# Patient Record
Sex: MALE | NOT HISPANIC OR LATINO | Employment: UNEMPLOYED | URBAN - METROPOLITAN AREA
[De-identification: names, ages, dates, MRNs, and addresses within clinical notes are randomized per-mention and may not be internally consistent; named-entity substitution may affect disease eponyms.]

---
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
## 2018-07-25 ENCOUNTER — OFFICE VISIT (OUTPATIENT)
Dept: FAMILY MEDICINE | Facility: CLINIC | Age: 18
End: 2018-07-25
Payer: COMMERCIAL

## 2018-07-25 VITALS
HEIGHT: 68 IN | WEIGHT: 123.4 LBS | SYSTOLIC BLOOD PRESSURE: 97 MMHG | RESPIRATION RATE: 18 BRPM | DIASTOLIC BLOOD PRESSURE: 60 MMHG | HEART RATE: 58 BPM | OXYGEN SATURATION: 97 % | TEMPERATURE: 97.4 F | BODY MASS INDEX: 18.7 KG/M2

## 2018-07-25 DIAGNOSIS — Z00.00 HEALTHCARE MAINTENANCE: ICD-10-CM

## 2018-07-25 DIAGNOSIS — Z00.121 ENCOUNTER FOR ROUTINE CHILD HEALTH EXAMINATION WITH ABNORMAL FINDINGS: Primary | ICD-10-CM

## 2018-07-25 LAB
CHOLEST SERPL-MCNC: 119 MG/DL
FASTING?: NO
HDLC SERPL-MCNC: 51 MG/DL
HIV 1+2 AB+HIV1 P24 AG SERPL QL IA: NEGATIVE
LDLC SERPL CALC-MCNC: 59 MG/DL
TRIGL SERPL-MCNC: 43 MG/DL

## 2018-07-25 NOTE — NURSING NOTE
Well child hearing and vision screening        HEARING FREQUENCY:  Right Ear:    500 Hz: 25 db HL present  1000 Hz: 20 db HL  present  2000 Hz: 20 db HL  present  4000 Hz: 20 db HL  present  6000 Hz: 20 dB HL (11 years and older)  present    Left Ear:    500 Hz: 25 db HL  present  1000 Hz: 20 db HL  present  2000 Hz: 20 db HL  present  4000 Hz: 20 db HL  present  6000 Hz: 20 dB HL (11 years and older)  present    Hearing Screen:  Pass-- [place] all tones    VISION:  Far vision: Right eye 20/40, Left eye 20/25  Plus lens (5 years and older who pass distance screening and do not have corrective lens):  Pass - blurred vision  Vision correction:  None    Eveline Allred, CMA

## 2018-07-25 NOTE — MR AVS SNAPSHOT
"              After Visit Summary   7/25/2018    Ji Yeboah    MRN: [id number]           Patient Information     Date Of Birth          2000        Visit Information        Provider Department      7/25/2018 1:20 PM César Bo MD [facility]        Today's Diagnoses     Encounter for routine child health examination with abnormal findings    -  1    Healthcare maintenance           Follow-ups after your visit        Who to contact     Please call your clinic at [phone] to:    Ask questions about your health    Make or cancel appointments    Discuss your medicines    Learn about your test results    Speak to your doctor            Additional Information About Your Visit        Care EveryWhere ID     This is your Care EveryWhere ID. This could be used by other organizations to access your [facility] medical records  [id number]        Your Vitals Were     Pulse Temperature Respirations Height Pulse Oximetry BMI (Body Mass Index)    58 97.4  F (36.3  C) (Oral) 18 5' 7.75\" (172.1 cm) 97% 18.9 kg/m2       Blood Pressure from Last 3 Encounters:   07/25/18 97/60   04/20/17 (!) 85/54   02/23/17 93/54    Weight from Last 3 Encounters:   07/25/18 123 lb 6.4 oz (56 kg)   04/20/17 122 lb (55.3 kg) (6 %)*   02/23/17 122 lb 12.8 oz (55.7 kg) (7 %)*     * Growth percentiles are based on CDC 2-20 Years data.              We Performed the Following     HIV Ag/Ab Screen [place] [facility]     Lipid [place] ([facility]) - Results > 1 hr     SCREENING TEST, PURE TONE, AIR ONLY     SCREENING, VISUAL ACUITY, QUANTITATIVE, BILAT     Social-emotional screen (PHQ-9) [address]7399     Syphilis Screen [place] (RPR/VDRL) ([facility])        Primary Care Provider Office Phone # Fax #    [place] Yousuf Palla, [phone]       [address] [address]        Equal Access to Services     CELESTINA BELLO AH: Татьяна shetty hadasho Soomaali, waaxda luqadaha, qaybta kaalmada " astrid lamblizatatum siria ah. Sophia [facility] [phone].    ATENCIÓN: Si habla español, tiene a gates disposición servicios gratuitos de asistencia lingüística. Marium al [phone].    We comply with applicable federal civil rights laws and [place] laws. We do not discriminate on the basis of race, color, national origin, age, disability, sex, sexual orientation, or gender identity.            Thank you!     Thank you for choosing [facility]  for your care. Our goal is always to provide you with excellent care. Hearing back from our patients is one way we can continue to improve our services. Please take a few minutes to complete the written survey that you may receive in the mail after your visit with us. Thank you!             Your Updated Medication List - Protect others around you: Learn how to safely use, store and throw away your medicines at www.disposemymeds.org.      Notice  As of 7/25/2018 11:59 PM    You have not been prescribed any medications.

## 2018-07-25 NOTE — PROGRESS NOTES
"Child & Teen Check Up Year 18-20       Health History       Growth Percentile:    Wt Readings from Last 3 Encounters:   07/25/18 123 lb 6.4 oz (56 kg)   04/20/17 122 lb (55.3 kg) (6 %)*   02/23/17 122 lb 12.8 oz (55.7 kg) (7 %)*     * Growth percentiles are based on [facility] 2-20 Years data.      Ht Readings from Last 2 Encounters:   07/25/18 5' 7.75\" (172.1 cm)   04/20/17 5' 7.52\" (171.5 cm) (24 %)*     * Growth percentiles are based on [facility] 2-20 Years data.    Normalized BMI data available only for age 0 to 20 years.    Visit Vitals: BP 97/60  Pulse 58  Temp 97.4  F (36.3  C) (Oral)  Resp 18  Ht 5' 7.75\" (172.1 cm)  Wt 123 lb 6.4 oz (56 kg)  SpO2 97%  BMI 18.9 kg/m2  BP Percentile: Normalized stature-for-age data not available for patients older than 20 years.    Informant: Patient    Patient, Family speaks English and so an  was not used.  Family History:   Family History   Problem Relation Age of Onset     Diabetes No family hx of      Coronary Artery Disease No family hx of      Hypertension No family hx of      Cerebrovascular Disease No family hx of      Breast Cancer No family hx of      Colon Cancer No family hx of      Prostate Cancer No family hx of      Other Cancer No family hx of      Asthma No family hx of      Dyslipidemia Screening:  Pediatric hyperlipidemia risk factors discussed today: No increased risk  Lipid screening performed (recommended if any risk factors): Yes    Social History:   Did the family/guardian worry about wether their food would run out before they got money to buy more? No  Did the family/guardian find that the food they bought didn't last long enough and they didn't have money to get more?  No    Social History     Social History     Marital status: Single     Spouse name: N/A     Number of children: N/A     Years of education: N/A     Social History Main Topics     Smoking status: Never Smoker     Smokeless tobacco: Never Used     Alcohol use 0.0 oz/week     0 " Standard drinks or equivalent per week      Comment: Occasionally.     Drug use: No     Sexual activity: Yes, condoms     Other Topics Concern     None     Social History Narrative     Medical History:   Past Medical History:   Diagnosis Date     Agoraphobia 4/13/2015    Per Jay 3/26/15      Intermittent explosive disorder 4/13/2015    Per Jay 3/26/15      Family History and past Medical History reviewed and unchanged/updated.    Vision Screen: Passed.  Hearing Screen: Passed.  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    Nutrition:    Describe intake: Balanced diet.    Environmental Risks:  TB exposure: No  Guns in house:None    STI Screening:  STI (including HIV) risk behaviors discussed today: Yes  HIV Screening (required once between ages 15-18 yrs): Ordered today  Other STI screening preformed (recommended if risk factors): No    Dental:  Have you been to a dentist this year? Yes and verbally encouraged family to continue to have annual dental check-up     Mental Health:  Teen Screen Discussed?: Yes     Nutrition:  Eating disorders and Healthy between-meal snacks, Safety:  Alcohol/drugs/tobacco use. and Seat belts, helmets. and Guidance:  Birth control, STDs, safer sex. and Stress, nervousness, sadness.         ROS   GENERAL: no recent fevers and activity level has been normal  SKIN: Negative for rash, birthmarks, acne, pigmentation changes  HEENT: Negative for hearing problems, vision problems, nasal congestion, eye discharge and eye redness  RESP: No cough, wheezing, difficulty breathing  CV: No cyanosis, fatigue with feeding  GI: Normal stools for age, no diarrhea or constipation   : Normal urination, no disharge or painful urination  MS: No swelling, muscle weakness, joint problems  NEURO: Moves all extremeties normally, normal activity for age  ALLERGY/IMMUNE: See allergy in history         Physical Exam:   BP 97/60  Pulse 58  Temp 97.4  F  "(36.3  C) (Oral)  Resp 18  Ht 5' 7.75\" (172.1 cm)  Wt 123 lb 6.4 oz (56 kg)  SpO2 97%  BMI 18.9 kg/m2    GENERAL: Alert, well nourished, well developed, no acute distress, interacts appropriately for age  SKIN: skin is clear, no rash, acne, abnormal pigmentation or lesions  HEAD: The head is normocephalic.  EYES:The conjunctivae and cornea normal. PERRL  EARS: The external auditory canals are clear and the tympanic membranes are normal; gray and transluscent.  NOSE: Clear, no discharge or congestion  MOUTH/THROAT: The throat is clear, tonsils:normal, no exudate or lesions. Normal teeth without obvious abnormalities  NECK: The neck is supple and thyroid is normal, no masses  LYMPH NODES: No adenopathy  LUNGS: The lung fields are clear to auscultation,no rales, rhonchi, wheezing or retractions  HEART: The precordium is quiet. Rhythm is regular. S1 and S2 are normal. No murmurs.  CHEST: soft fluid collection below sternum, no erythema or tenderness to palpation  ABDOMEN: The bowel sounds are normal. Abdomen soft, non tender,  non distended, no masses or hepatosplenomegaly.  EXTREMITIES: Symmetric extremities, FROM, no deformities. Spine is straight, no scoliosis  NEUROLOGIC: No focal findings. Cranial nerves grossly intact: DTR's normal. Normal gait, strength and tone         Assessment and Plan   1. Encounter for routine child health examination with abnormal findings  Chest fluid collection most likely due to new, large tattoo on the upper chest that may have disrupted his lymphatics in that area. Told the patient this should go down with time and to watch for infection.    2. Healthcare maintenance  Discussed recommended screenings with the patient which he accepted except GC/Ch which he declined as he has no symptoms.  - SCREENING TEST, PURE TONE, AIR ONLY  - SCREENING, VISUAL ACUITY, QUANTITATIVE, BILAT  - Social-emotional screen (PHQ-9) 13533  - HIV Ag/Ab Screen [place] ([facility])  - Lipid [place] " ([facility]) - Results > 1 hr  - Syphilis Screen [place] (RPR/VDRL) ([facility])    Reason for Visit:   Chief Complaint   Patient presents with     Well Child     No concerns.     Medication Reconciliation     Completed.      Additional Diagnoses: None    No referrals were made today.    Patient Health Questionnaire - 9   Score of 0  No concerns. Routine follow-up.    Immunizations:    Hx immunization reactions?  No  Immunization schedule reviewed: Yes:  Following immunizations advised:  Patient up to date on all immunizations.    Schedule next visit in 2 years    Céasr Bo MD  [facility], PGY-2    Precepted with Dr. Hathaway.

## 2018-07-25 NOTE — LETTER
July 25, 2018      Ji Yeboah  [address]  [place] [address]      To whom it may concern,    The patient listed above is currently under my care. He is unfortunately suffering from acne vulgaris which can be exacerbated by physical irritation such as shaving. I understand that the patient is currently required to shave daily for his employment, but that this requirement could be amended if he provided a doctor's note. It is my medical opinion that Zay be allowed to keep facial hair and not shave in order to help control his acne.    Please do not hesitate to reach out to my office with any questions.    Sincerely,    César Bo MD

## 2018-07-25 NOTE — LETTER
July 27, 2018      Ji Yeboah  [address]  [place] [address]        Ji,    It was nice to meet you in clinic this week, your HIV and Syphilis were negative as we expected. Your cholesterol (lipids) look excellent. Please let us know if you ever have any other questions or concerns.    Resulted Orders   HIV Ag/Ab Screen [place] ([facility])   Result Value Ref Range    HIV Antigen/Antibody Negative Negative    Narrative    Test performed by:  [facility] [address] [place] [address]  Method is Abbott HIV Ag/Ab for the detection of HIV p24 antigen, HIV-1   antibodies and HIV-2 antibodies.   Lipid [place] ([facility]) - Results > 1 hr   Result Value Ref Range    Cholesterol 119 <=199 mg/dL    Triglycerides 43 <=149 mg/dL    HDL Cholesterol 51 >=40 mg/dL    LDL Cholesterol Calculated 59 <=129 mg/dL    Fasting? No     Narrative    Test performed by:  [facility]  [address] [place] [address]   Syphilis Screen [place] (RPR/VDRL) ([facility])   Result Value Ref Range    Treponema Antibody (Syphilis) Negative Negative    Narrative    Test performed by:  [facility] [address] [place] [address]     Take care,    César Bo MD

## 2018-07-25 NOTE — PROGRESS NOTES
Preceptor Attestation:   Patient seen, evaluated and discussed with the resident. I have verified the content of the note, which accurately reflects my assessment of the patient and the plan of care.  Supervising Physician:Angelo Hathaway MD  [facility]

## 2018-07-26 LAB — TREPONEMA ANTIBODY (SYPHILIS): NEGATIVE

## 2018-12-13 ENCOUNTER — COMMUNICATION - HEALTHEAST (OUTPATIENT)
Dept: SCHEDULING | Facility: CLINIC | Age: 18
End: 2018-12-13

## 2019-01-31 ENCOUNTER — OFFICE VISIT (OUTPATIENT)
Dept: FAMILY MEDICINE | Facility: CLINIC | Age: 19
End: 2019-01-31
Payer: COMMERCIAL

## 2019-01-31 VITALS
OXYGEN SATURATION: 97 % | TEMPERATURE: 97.4 F | BODY MASS INDEX: 19.53 KG/M2 | HEART RATE: 66 BPM | WEIGHT: 124.4 LBS | HEIGHT: 67 IN | SYSTOLIC BLOOD PRESSURE: 99 MMHG | RESPIRATION RATE: 18 BRPM | DIASTOLIC BLOOD PRESSURE: 63 MMHG

## 2019-01-31 DIAGNOSIS — Z02.5 ROUTINE SPORTS PHYSICAL EXAM: Primary | ICD-10-CM

## 2019-01-31 ASSESSMENT — MIFFLIN-ST. JEOR: SCORE: 1536.86

## 2019-01-31 NOTE — PROGRESS NOTES
Assessment and Plan   1. Routine sports physical exam  Cleared for wrestling.    Options for treatment and follow-up care were reviewed with the patient and/or guardian. Ji Yeboah and/or guardian engaged in the decision making process and verbalized understanding of the options discussed and agreed with the final plan.    César Bo MD  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         Review of Systems:   A comprehensive 12 point review of systems was negative unless otherwise noted in the HPI.          PMHX:   Active Problems List  Patient Active Problem List   Diagnosis     ADHD (attention deficit hyperactivity disorder)     Acne     Agoraphobia     Intermittent explosive disorder     Learning disorder     Active problem list reviewed and updated.    Current Medications  No current outpatient medications on file.     Medication list reviewed and updated.    Social History  Social History     Tobacco Use     Smoking status: Never Smoker     Smokeless tobacco: Never Used   Substance Use Topics     Alcohol use: Yes     Alcohol/week: 0.0 oz     Comment: Occasionally.     Drug use: No     History   Drug Use No     Allergies  Allergies   Allergen Reactions     Nkda [No Known Drug Allergies]      Seasonal Allergies      Allergies and Medication Intolerances Updated         Physical Exam:     Vitals:    01/31/19 1339   BP: 99/63   Pulse: 66   Resp: 18   Temp: 97.4  F (36.3  C)   TempSrc: Oral   SpO2: 97%   Weight:  "56.4 kg (124 lb 6.4 oz)   Height: 1.708 m (5' 7.25\")     Body mass index is 19.34 kg/m .    GENERAL: Alert, well nourished, well developed, no acute distress  SKIN: skin is clear, no rash, +acne  HEAD: The head is normocephalic  EYES:The conjunctivae and cornea normal.   EARS: The external auditory canals are clear and the tympanic membranes are normal; gray and transluscent.  NOSE: Clear, no discharge or congestion  MOUTH/THROAT: The throat is clear, tonsils:normal, no exudate or lesions. Normal teeth without obvious abnormalities  NECK: The neck is supple and thyroid is normal, no masses  LUNGS: The lung fields are clear to auscultation,no rales, rhonchi, wheezing or retractions  HEART: The precordium is quiet. Rhythm is regular. S1 and S2 are normal. No murmurs.  ABDOMEN: The bowel sounds are normal. Abdomen soft, non tender,  non distended, no masses or hepatosplenomegaly.  M-GENITALIA: Normal male external genitalia. Testes descended bilateraly, no hernia or hydrocele.  EXTREMITIES: Symmetric extremities, FROM, no deformities. Spine is straight, no scoliosis  NEUROLOGIC: No focal findings. Cranial nerves grossly intact: DTR's normal. Normal gait, strength and tone  Shoulder: Normal  Elbow: Normal  Hand/Wrist: Normal  Back: Normal  Quad/Ham: Normal  Knee: Normal  Ankle/Feet: Normal  Heel/Toe: Normal  Duck walk: Normal    "

## 2020-01-18 ENCOUNTER — HOSPITAL ENCOUNTER (EMERGENCY)
Facility: CLINIC | Age: 20
Discharge: HOME | End: 2020-01-18
Attending: EMERGENCY MEDICINE | Admitting: EMERGENCY MEDICINE

## 2020-01-18 ENCOUNTER — APPOINTMENT (OUTPATIENT)
Dept: GENERAL RADIOLOGY | Facility: CLINIC | Age: 20
End: 2020-01-18
Attending: EMERGENCY MEDICINE

## 2020-01-18 VITALS
HEART RATE: 109 BPM | TEMPERATURE: 97.6 F | OXYGEN SATURATION: 98 % | HEIGHT: 67 IN | SYSTOLIC BLOOD PRESSURE: 98 MMHG | DIASTOLIC BLOOD PRESSURE: 49 MMHG | WEIGHT: 120 LBS | RESPIRATION RATE: 14 BRPM | BODY MASS INDEX: 18.83 KG/M2

## 2020-01-18 DIAGNOSIS — F41.0 PANIC ATTACK: ICD-10-CM

## 2020-01-18 LAB
ANION GAP SERPL CALCULATED.3IONS-SCNC: 8 MMOL/L (ref 3–14)
BASOPHILS # BLD AUTO: 0 10E9/L (ref 0–0.2)
BASOPHILS NFR BLD AUTO: 0.2 %
BUN SERPL-MCNC: 9 MG/DL (ref 7–30)
CALCIUM SERPL-MCNC: 9.1 MG/DL (ref 8.5–10.1)
CHLORIDE SERPL-SCNC: 105 MMOL/L (ref 94–109)
CO2 SERPL-SCNC: 25 MMOL/L (ref 20–32)
CREAT SERPL-MCNC: 0.89 MG/DL (ref 0.66–1.25)
D DIMER PPP FEU-MCNC: 0.3 UG/ML FEU (ref 0–0.5)
DIFFERENTIAL METHOD BLD: NORMAL
EOSINOPHIL # BLD AUTO: 0.2 10E9/L (ref 0–0.7)
EOSINOPHIL NFR BLD AUTO: 1.8 %
ERYTHROCYTE [DISTWIDTH] IN BLOOD BY AUTOMATED COUNT: 13.1 % (ref 10–15)
GFR SERPL CREATININE-BSD FRML MDRD: >90 ML/MIN/{1.73_M2}
GLUCOSE SERPL-MCNC: 163 MG/DL (ref 70–99)
HCT VFR BLD AUTO: 45.2 % (ref 40–53)
HGB BLD-MCNC: 15.6 G/DL (ref 13.3–17.7)
IMM GRANULOCYTES # BLD: 0 10E9/L (ref 0–0.4)
IMM GRANULOCYTES NFR BLD: 0 %
LYMPHOCYTES # BLD AUTO: 2.7 10E9/L (ref 0.8–5.3)
LYMPHOCYTES NFR BLD AUTO: 31.7 %
MCH RBC QN AUTO: 30.1 PG (ref 26.5–33)
MCHC RBC AUTO-ENTMCNC: 34.5 G/DL (ref 31.5–36.5)
MCV RBC AUTO: 87 FL (ref 78–100)
MONOCYTES # BLD AUTO: 0.4 10E9/L (ref 0–1.3)
MONOCYTES NFR BLD AUTO: 4.6 %
NEUTROPHILS # BLD AUTO: 5.3 10E9/L (ref 1.6–8.3)
NEUTROPHILS NFR BLD AUTO: 61.7 %
NRBC # BLD AUTO: 0 10*3/UL
NRBC BLD AUTO-RTO: 0 /100
PLATELET # BLD AUTO: 241 10E9/L (ref 150–450)
POTASSIUM SERPL-SCNC: 3.3 MMOL/L (ref 3.4–5.3)
RBC # BLD AUTO: 5.19 10E12/L (ref 4.4–5.9)
SODIUM SERPL-SCNC: 138 MMOL/L (ref 133–144)
WBC # BLD AUTO: 8.6 10E9/L (ref 4–11)

## 2020-01-18 PROCEDURE — 80048 BASIC METABOLIC PNL TOTAL CA: CPT | Performed by: EMERGENCY MEDICINE

## 2020-01-18 PROCEDURE — 71046 X-RAY EXAM CHEST 2 VIEWS: CPT

## 2020-01-18 PROCEDURE — 85025 COMPLETE CBC W/AUTO DIFF WBC: CPT | Performed by: EMERGENCY MEDICINE

## 2020-01-18 PROCEDURE — 99285 EMERGENCY DEPT VISIT HI MDM: CPT | Mod: 25

## 2020-01-18 PROCEDURE — 96374 THER/PROPH/DIAG INJ IV PUSH: CPT

## 2020-01-18 PROCEDURE — 85379 FIBRIN DEGRADATION QUANT: CPT | Performed by: EMERGENCY MEDICINE

## 2020-01-18 PROCEDURE — 25000128 H RX IP 250 OP 636: Performed by: EMERGENCY MEDICINE

## 2020-01-18 PROCEDURE — 93005 ELECTROCARDIOGRAM TRACING: CPT

## 2020-01-18 RX ORDER — LORAZEPAM 2 MG/ML
1 INJECTION INTRAMUSCULAR ONCE
Status: COMPLETED | OUTPATIENT
Start: 2020-01-18 | End: 2020-01-18

## 2020-01-18 RX ADMIN — LORAZEPAM 1 MG: 2 INJECTION INTRAMUSCULAR; INTRAVENOUS at 22:43

## 2020-01-18 ASSESSMENT — ENCOUNTER SYMPTOMS
SHORTNESS OF BREATH: 1
PALPITATIONS: 1

## 2020-01-18 ASSESSMENT — MIFFLIN-ST. JEOR: SCORE: 1507.95

## 2020-01-18 NOTE — ED AVS SNAPSHOT
Emergency Department  [address] [facility] [address]  Phone:  [phone]  Fax:  [phone]                                    Ji Yeboah   MRN: [id number]    Department:   Emergency Department   Date of Visit:  1/18/2020           After Visit Summary Signature Page    I have received my discharge instructions, and my questions have been answered. I have discussed any challenges I see with this plan with the nurse or doctor.    ..........................................................................................................................................  Patient/Patient Representative Signature      ..........................................................................................................................................  Patient Representative Print Name and Relationship to Patient    ..................................................               ................................................  Date                                   Time    ..........................................................................................................................................  Reviewed by Signature/Title    ...................................................              ..............................................  Date                                               Time          22EPIC Rev 08/18

## 2020-01-19 NOTE — ED PROVIDER NOTES
"  History     Chief Complaint:  Shortness of Breath    The history is provided by the patient.      Ji Yeboah is a 19 year old male who presents with concerns for 15 minutes of shortness of breath, rapid palpitations, and chest tightness. He asserts he was playing video games during onset of symptoms. Patient's significant other acknowledge history of similar symptoms in October of 2019 that was evaluated at [facility]. He denies taking any medications for his symptoms.  Notably, Ji asserts he ate a marijuana edible couple hours ago. He denies any other complaints, recent travel, or history of CAD and DVT/PE. No use of OTC decongestants or additional drug use.      Allergies:  No Known Drug Allergies  Seasonal    Medications:    The patient is not currently on any daily prescription medications.    Past Medical History:    Agoraphobia  Intermittent explosive disorder    Past Surgical History:    Right elbow repair    Family History:    No past pertinent family history.    Social History:  Accompanied by significant other.  Never Smoker  Alcohol Use: Positive  Drug use: Marijuana  Marital Status: Single      Review of Systems   Respiratory: Positive for shortness of breath.    Cardiovascular: Positive for chest pain and palpitations.   All other systems reviewed and are negative.    Physical Exam   Patient Vitals for the past 24 hrs:   BP Temp Temp src Pulse Heart Rate Resp SpO2 Height Weight   01/18/20 2330 -- -- -- -- 96 14 97 % -- --   01/18/20 2315 -- -- -- -- 98 16 96 % -- --   01/18/20 2245 -- -- -- -- 124 10 99 % -- --   01/18/20 2230 134/80 97.6  F (36.4  C) Oral 109 106 20 99 % 1.702 m (5' 7\") 54.4 kg (120 lb)     Physical Exam  Constitutional: Thin young white male, hyperventilating   HENT: No signs of trauma.   Eyes: EOM are normal. Pupils are equal, round, and reactive to light.   Neck: Normal range of motion. No JVD present. No cervical adenopathy.  Cardiovascular: Regular rapid. 2+ radial and " femoral pulses. Exam reveals no gallop and no friction rub.    No murmur heard.  Pulmonary/Chest: Bilateral breath sounds normal. No wheezes, rhonchi or rales.  Abdominal: Soft. No tenderness. No rebound or guarding.   Musculoskeletal: No edema. No tenderness.   Lymphadenopathy: No lymphadenopathy.   Neurological: Alert and oriented to person, place, and time. Normal strength. Coordination normal.   Psychological: No overt psychosis.   Skin: Skin is warm and dry. No rash noted. No erythema.     Emergency Department Course   ECG:  ECG taken at 2226  Sinus tachycardia  Nonspecific ST and T wave changes   Rate 121 bpm. KS interval 146 ms. QRS duration 92 ms. QT/QTc 314/445 ms. P-R-T axes 69 73 21.    Imaging:  Chest XR,  PA & LAT  No focal air-space disease or other acute findings.  Reading per radiology    Laboratory:  CBC: WBC 8.6, HGB 15.6,   BMP: K 3.3 glc 163 o/w WNL (Creatinine 0.89)    D-dimer: 0.3    Interventions:  2243: Ativan 1 mg IV    Emergency Department Course:  Nursing notes and vitals reviewed.  2225 I performed an exam of the patient as documented above.   2226 EKG obtained in the ED, see results above.   2253 Blood was drawn for laboratory testing, results above.  Findings and plan explained to the patient. Patient discharged home with instructions regarding supportive care, medications, and reasons to return. The importance of close follow-up was reviewed.     Impression & Plan      Medical Decision Making:  This is a 19 year old male who came in with sudden episode of rapid heartbeat, shortness of breath, and panic. Patient had been playing games when this seem to come on all of sudden. He does have a history of intermittent explosive behavior and ADHD and is on no meds. He denies drug use other than occasional marijuana edibles which he did have today. There is no history of heart disease or blood clots. Patient is hyperventilating. He is tachycardic and he appears very anxious. There is no  overt psychosis. EKG showed a sinus tachycardia. Chest x-ray and blood work was unremarkable. Patient received Ativan and is sleeping comfortably. He is no longer tachycardic. He is not hypoxic and his blood pressure is stable. Patient will be discharged home with friend. I have asked her to have him follow-up with his primary and also to avoid any further marijuana edibles. If symptoms recur, recheck in the ED.    Diagnosis:    ICD-10-CM   1. Panic attack F41.0     Disposition: discharged to home with friend    Scribe Disclosure:   I, Mulugeta Villanueva, am serving as a scribe at 10:47 PM on 1/18/2020 to document services personally performed by Con Nieves MD based on my observations and the provider's statements to me.      EMERGENCY DEPARTMENT       Con Nieves MD  01/19/20 0020

## 2022-11-28 ENCOUNTER — APPOINTMENT (OUTPATIENT)
Dept: RADIOLOGY | Facility: HOSPITAL | Age: 22
End: 2022-11-28
Attending: STUDENT IN AN ORGANIZED HEALTH CARE EDUCATION/TRAINING PROGRAM
Payer: COMMERCIAL

## 2022-11-28 ENCOUNTER — HOSPITAL ENCOUNTER (EMERGENCY)
Facility: HOSPITAL | Age: 22
Discharge: HOME | End: 2022-11-28
Admitting: NURSE PRACTITIONER
Payer: COMMERCIAL

## 2022-11-28 VITALS
HEART RATE: 68 BPM | OXYGEN SATURATION: 99 % | SYSTOLIC BLOOD PRESSURE: 101 MMHG | WEIGHT: 130 LBS | TEMPERATURE: 98.6 F | RESPIRATION RATE: 16 BRPM | DIASTOLIC BLOOD PRESSURE: 83 MMHG | BODY MASS INDEX: 20.36 KG/M2

## 2022-11-28 DIAGNOSIS — S59.911D RIGHT FOREARM INJURY, SUBSEQUENT ENCOUNTER: ICD-10-CM

## 2022-11-28 PROCEDURE — 73110 X-RAY EXAM OF WRIST: CPT | Mod: LT

## 2022-11-28 PROCEDURE — 73080 X-RAY EXAM OF ELBOW: CPT | Mod: LT

## 2022-11-28 PROCEDURE — 99284 EMERGENCY DEPT VISIT MOD MDM: CPT

## 2022-11-28 RX ORDER — OXYCODONE HYDROCHLORIDE 5 MG/1
5 TABLET ORAL EVERY 6 HOURS PRN
Qty: 8 TABLET | Refills: 0 | Status: SHIPPED | OUTPATIENT
Start: 2022-11-28 | End: 2022-12-01

## 2022-11-28 ASSESSMENT — ENCOUNTER SYMPTOMS: TINGLING: 0

## 2022-11-28 NOTE — ED TRIAGE NOTES
Pt here for pain management for left arm after a fall about three days ago. Seen at [facility]. Follow up with ortho tomorrow.

## 2022-11-28 NOTE — ED NOTES
"ED Triage Provider Note  [facility]  Encounter Date: Nov 28, 2022    History:  Chief Complaint   Patient presents with     Pain Management     Ji Yeboah is a 22 year old male who presents to the ED with wrist and elbow pain.  Had a fall from height 2 days ago.  Negative xray.  In splint and has continued pain and \"doesn't know what to do\"     Review of Systems:  No numbness, no fever    Exam:  PHYSICAL EXAM    VITAL SIGNS: /83   Pulse 68   Temp 98.6  F (37  C) (Temporal)   Resp 16   Wt 59 kg (130 lb)   SpO2 99%   BMI 20.36 kg/m    Constitutional:  Well developed, well nourished,    EYES: Conjunctivae clear, no discharge  HENT: Atraumatic, normocephalic, bilateral external ears normal.  Oropharynx moist. Nose normal.   Neck: Normal ROM , Supple   Respiratory:  No respiratory distress, normal nonlabored respirations.   Cardiovascular:  Distal perfusion appears intact  Musculoskeletal:  No edema appreciated, No cyanosis, No clubbing. Good range of motion in all major joints.   Integument:  Warm, Dry, No erythema, No rash.   Neurologic:  Alert and oriented. No focal deficits noted.  Ambulatory  Psychiatric:  Affect normal    Medical Decision Making:  Patient arriving to the ED with problem as above. A medical screening exam was performed.Xrays and work up negative 2 days ago.  Could be occult fracture or compartment syndrome less likely as there was no innjury on first xray.    Will order xray and triage to .    Dr. Oliveros on 11/28/2022 at 5:10 PM      Lab/Imaging Results:       Interventions:  Medications - No data to display    Diagnosis:  No diagnosis found.     Juan Francisco Oliveros MD  11/28/22 1712    "

## 2022-11-29 ENCOUNTER — PATIENT OUTREACH (OUTPATIENT)
Dept: CARE COORDINATION | Facility: CLINIC | Age: 22
End: 2022-11-29

## 2022-11-29 NOTE — PROGRESS NOTES
Clinic Care Coordination Contact    Follow Up Progress Note      Assessment: The pt was recently in the ED, I called to check up on the pt and help the pt setup a ED follow up. The pt was at [facility] for pain. I called and talked to the pt, pt stated that he is doing ok. Pt stated that he has other follow up, and will call to schedule a follow up.     Care Gaps:    Health Maintenance Due   Topic Date Due     ADVANCE CARE PLANNING  Never done     COVID-19 Vaccine (1) Never done     HEPATITIS C SCREENING  Never done     YEARLY PREVENTIVE VISIT  01/31/2020     PHQ-2 (once per calendar year)  01/01/2022     INFLUENZA VACCINE (1) 09/01/2022           Care Plans      Intervention/Education provided during outreach:               Plan:     Care Coordinator will follow up in

## 2022-11-29 NOTE — ED PROVIDER NOTES
EMERGENCY DEPARTMENT ENCOUNTER      NAME: Ji Yeboah  AGE: 22 year old male  YOB: 2000  MRN: [id number]  EVALUATION DATE & TIME: No admission date for patient encounter.    PCP: Ronald Perez    ED PROVIDER: MANJEET Gabriel, CNP      Chief Complaint   Patient presents with     Pain Management         FINAL IMPRESSION:  1. Right forearm injury, subsequent encounter          ED COURSE & MEDICAL DECISION MAKIN:24 PM I met with the patient, obtained history, performed an initial exam, and discussed options and plan for treatment here in the ED.    Pertinent Labs & Imaging studies reviewed. (See chart for details)  22 year old male presents to the Emergency Department for evaluation of arm pain.  Recently evaluated after a significant fall.  No fractures or other significant acute injuries were found.  She reports there was concern for possible elbow fracture or other occult injury and he was placed in a left long-arm splint.  He presents today with increasing pain in the left arm when he moves his fingers.  His capillary refill is less than 2 seconds and he has intact sensation in the left upper extremity.  Dated today does not seem consistent with compartment syndrome.  Imaging today did show area of suspicion in the left radial head.  We will leave him in the splint.  He has follow-up with orthopedics tomorrow.  Given instructions regarding ongoing pain management with ibuprofen and acetaminophen.  Given a prescription for oxycodone.    Medical Decision Making    History:    Supplemental history from: N/A    External Record(s) reviewed: Documented in HPI, if applicable.    Work Up:    Chart documentation includes differential considered and any EKGs or imaging interpreted by provider.    In additional to work up documented, I considered the following work up: See chart documentation, if applicable.    External consultation:    Discussion of management with another provider: See chart  documentation, if applicable    Complicating factors:    Care impacted by chronic illness: None    Care affected by social determinants of health: N/A    Disposition considerations: Discharge. I prescribed additional prescription strength medication(s) as charted. I did not consider admission.          At the conclusion of the encounter I discussed the results of all of the tests and the disposition. The questions were answered. The patient or family acknowledged understanding and was agreeable with the care plan.       0 minutes of critical care time     MEDICATIONS GIVEN IN THE EMERGENCY:  Medications - No data to display    NEW PRESCRIPTIONS STARTED AT TODAY'S ER VISIT  New Prescriptions    OXYCODONE (ROXICODONE) 5 MG TABLET    Take 1 tablet (5 mg) by mouth every 6 hours as needed for severe pain (7-10)            =================================================================    HPI    Patient information was obtained from: Patient    Use of Intrepreter: N/A         Ji Yeboah is a 22 year old male with no pertinent history who presents arm pain    Patient reports he was working on a 2-3 story roof when he fell off the ladder. He is unaware of how he landed as he lost consciousness. He was seen at [facility] where there were no fractures found. He reports shooting pain in his left arm around his elbow that worsens with movement. He has been taking ibuprofen but it doesn't help. Denies numbness or tingling or any other complaints.    Reviewed ED visit from [facility] on 11/26/22.  No acute findings of CT head, C-spine, facial bones.  Right wrist x-ray did not show any fractures or dislocations.  Left elbow XR did not show any fracture or joint effusion.    REVIEW OF SYSTEMS   Review of Systems   Musculoskeletal: Positive for joint pain (left elbow).   Neurological: Negative for tingling.   All other systems reviewed and are negative.       PAST MEDICAL HISTORY:  Past Medical History:   Diagnosis Date      Agoraphobia 4/13/2015    Per Jay 3/26/15      Intermittent explosive disorder 4/13/2015    Per Jay 3/26/15        PAST SURGICAL HISTORY:  Past Surgical History:   Procedure Laterality Date     ORTHOPEDIC SURGERY      right elbow           CURRENT MEDICATIONS:    No current facility-administered medications for this encounter.     Current Outpatient Medications   Medication     oxyCODONE (ROXICODONE) 5 MG tablet         ALLERGIES:  Allergies   Allergen Reactions     Nkda [No Known Drug Allergies]      Seasonal Allergies        FAMILY HISTORY:  Family History   Problem Relation Age of Onset     Diabetes No family hx of      Coronary Artery Disease No family hx of      Hypertension No family hx of      Cerebrovascular Disease No family hx of      Breast Cancer No family hx of      Colon Cancer No family hx of      Prostate Cancer No family hx of      Other Cancer No family hx of      Asthma No family hx of        SOCIAL HISTORY:   Social History     Socioeconomic History     Marital status: Single     Spouse name: None     Number of children: None     Years of education: None     Highest education level: None   Substance and Sexual Activity     Alcohol use: Yes     Alcohol/week: 0.0 standard drinks     Comment: Occasionally.     Drug use: No     Sexual activity: Yes     Birth control/protection: Condom         VITALS:  Patient Vitals for the past 24 hrs:   BP Temp Temp src Pulse Resp SpO2 Weight   11/28/22 1708 101/83 98.6  F (37  C) Temporal 68 16 99 % 59 kg (130 lb)       PHYSICAL EXAM    Constitutional:  Well developed, well nourished, no acute distress  EYES: Conjunctivae clear  HENT: Left forehead contusion.  Respiratory:  No respiratory distress  Musculoskeletal: Wearing a Velcro splint on the left wrist.  Long-arm splint on the left upper extremity.  Sensation in the medial, ulnar, and radial distributions of the exposed fingers on the left hand is intact.  Capillary refill in the left upper  extremity less than 2 seconds.  Integument: Warm, Dry  Neurologic:  Alert & oriented x 3              LAB:  All pertinent labs reviewed and interpreted.  Labs Ordered and Resulted from Time of ED Arrival to Time of ED Departure - No data to display      RADIOLOGY:  Reviewed all pertinent imaging. Please see official radiology report.  XR Wrist Left G/E 3 Views   Final Result   IMPRESSION: Normal joint spaces and alignment. No fracture. Splint is in place.      Elbow  XR, G/E 3 views, left   Final Result   IMPRESSION: Subtle sclerosis at the radial head neck junction could be due to an acute impacted fracture without discrete fracture lucency. Follow-up radiographs or MRI could be performed for further characterization. No elbow joint effusion. Normal    alignment.                PROCEDURES:   None      I, Faiza Thomason, am serving as a scribe to document services personally performed by Douglas Forde CNP. based on my observation and the provider's statements to me. Douglas GUERRA CNP attest that Faiza Thomason is acting in a scribe capacity, has observed my performance of the services and has documented them in accordance with my direction.    MANJEET Gabriel, CNP  Emergency Medicine  [facility] EMERGENCY DEPARTMENT  [facility] [facility] [address] [phone]  Dept: [phone]         Douglas Forde APRN CNP  11/28/22 [id number]

## 2022-11-29 NOTE — DISCHARGE INSTRUCTIONS
Your xrays today showed an area suspicions for an elbow fracture. You should continue to wear the splint and sling.    You should take ibuprofen, 600mg, three times per day as needed for pain.  You can also use acetaminophen, 650 mg,up to four times per day as needed for pain.  You can use these medications together, there are no concerning interactions.  If this does not adequately control your pain, you can use 1 tabs of the prescribed oxycodone every6 hours as needed for uncontrolled pain.  If you use this medication, you should not drive or perform other activities that require full attention as this medication may make you drowsy. oxycodone like all opiate painmedications, is potentially habit forming and you should only use the minimum amount necessary to control your pain.      Follow up with orthopedics tomorrow as planned